# Patient Record
Sex: FEMALE | Race: WHITE
[De-identification: names, ages, dates, MRNs, and addresses within clinical notes are randomized per-mention and may not be internally consistent; named-entity substitution may affect disease eponyms.]

---

## 2019-08-05 ENCOUNTER — HOSPITAL ENCOUNTER (INPATIENT)
Dept: HOSPITAL 62 - LC | Age: 24
LOS: 2 days | Discharge: HOME | DRG: 831 | End: 2019-08-07
Attending: OBSTETRICS & GYNECOLOGY | Admitting: OBSTETRICS & GYNECOLOGY
Payer: OTHER GOVERNMENT

## 2019-08-05 DIAGNOSIS — O99.513: Primary | ICD-10-CM

## 2019-08-05 DIAGNOSIS — J30.9: ICD-10-CM

## 2019-08-05 DIAGNOSIS — J18.9: ICD-10-CM

## 2019-08-05 DIAGNOSIS — Z3A.39: ICD-10-CM

## 2019-08-05 LAB
%HYPO/RBC NFR BLD AUTO: SLIGHT %
ABSOLUTE LYMPHOCYTES# (MANUAL): 2.5 10^3/UL (ref 0.5–4.7)
ABSOLUTE MONOCYTES # (MANUAL): 1 10^3/UL (ref 0.1–1.4)
ADD MANUAL DIFF: YES
ALBUMIN SERPL-MCNC: 3.2 G/DL (ref 3.5–5)
ALP SERPL-CCNC: 90 U/L (ref 38–126)
ANION GAP SERPL CALC-SCNC: 9 MMOL/L (ref 5–19)
APPEARANCE UR: CLEAR
APTT PPP: YELLOW S
AST SERPL-CCNC: 21 U/L (ref 14–36)
BARBITURATES UR QL SCN: NEGATIVE
BASOPHILS NFR BLD MANUAL: 0 % (ref 0–2)
BILIRUB DIRECT SERPL-MCNC: 0.2 MG/DL (ref 0–0.4)
BILIRUB SERPL-MCNC: 0.3 MG/DL (ref 0.2–1.3)
BILIRUB UR QL STRIP: NEGATIVE
BUN SERPL-MCNC: 6 MG/DL (ref 7–20)
CALCIUM: 8.5 MG/DL (ref 8.4–10.2)
CHLORIDE SERPL-SCNC: 104 MMOL/L (ref 98–107)
CO2 SERPL-SCNC: 24 MMOL/L (ref 22–30)
EOSINOPHIL NFR BLD MANUAL: 0 % (ref 0–6)
ERYTHROCYTE [DISTWIDTH] IN BLOOD BY AUTOMATED COUNT: 13.5 % (ref 11.5–14)
GLUCOSE SERPL-MCNC: 88 MG/DL (ref 75–110)
GLUCOSE UR STRIP-MCNC: NEGATIVE MG/DL
HCT VFR BLD CALC: 36.9 % (ref 36–47)
HGB BLD-MCNC: 12.6 G/DL (ref 12–15.5)
KETONES UR STRIP-MCNC: (no result) MG/DL
MCH RBC QN AUTO: 32.8 PG (ref 27–33.4)
MCHC RBC AUTO-ENTMCNC: 34 G/DL (ref 32–36)
MCV RBC AUTO: 96 FL (ref 80–97)
METHADONE UR QL SCN: NEGATIVE
MONOCYTES % (MANUAL): 5 % (ref 3–13)
NEUTS BAND NFR BLD MANUAL: 6 % (ref 3–5)
NITRITE UR QL STRIP: NEGATIVE
PCP UR QL SCN: NEGATIVE
PH UR STRIP: 6 [PH] (ref 5–9)
PLATELET # BLD: 138 10^3/UL (ref 150–450)
PLATELET COMMENT: (no result)
POTASSIUM SERPL-SCNC: 3.8 MMOL/L (ref 3.6–5)
PROT SERPL-MCNC: 5.7 G/DL (ref 6.3–8.2)
PROT UR STRIP-MCNC: NEGATIVE MG/DL
RBC # BLD AUTO: 3.83 10^6/UL (ref 3.72–5.28)
SEGMENTED NEUTROPHILS % (MAN): 77 % (ref 42–78)
SP GR UR STRIP: 1.02
TOTAL CELLS COUNTED BLD: 100
URINE AMPHETAMINES SCREEN: NEGATIVE
URINE BENZODIAZEPINES SCREEN: NEGATIVE
URINE COCAINE SCREEN: NEGATIVE
URINE MARIJUANA (THC) SCREEN: NEGATIVE
UROBILINOGEN UR-MCNC: 2 MG/DL (ref ?–2)
VARIANT LYMPHS NFR BLD MANUAL: 12 % (ref 13–45)
WBC # BLD AUTO: 20.7 10^3/UL (ref 4–10.5)

## 2019-08-05 PROCEDURE — 94760 N-INVAS EAR/PLS OXIMETRY 1: CPT

## 2019-08-05 PROCEDURE — 81001 URINALYSIS AUTO W/SCOPE: CPT

## 2019-08-05 PROCEDURE — 83605 ASSAY OF LACTIC ACID: CPT

## 2019-08-05 PROCEDURE — 80307 DRUG TEST PRSMV CHEM ANLYZR: CPT

## 2019-08-05 PROCEDURE — 59025 FETAL NON-STRESS TEST: CPT

## 2019-08-05 PROCEDURE — 80202 ASSAY OF VANCOMYCIN: CPT

## 2019-08-05 PROCEDURE — 87804 INFLUENZA ASSAY W/OPTIC: CPT

## 2019-08-05 PROCEDURE — 80053 COMPREHEN METABOLIC PANEL: CPT

## 2019-08-05 PROCEDURE — 85025 COMPLETE CBC W/AUTO DIFF WBC: CPT

## 2019-08-05 PROCEDURE — 36415 COLL VENOUS BLD VENIPUNCTURE: CPT

## 2019-08-05 PROCEDURE — 82565 ASSAY OF CREATININE: CPT

## 2019-08-05 PROCEDURE — 94640 AIRWAY INHALATION TREATMENT: CPT

## 2019-08-05 PROCEDURE — 87040 BLOOD CULTURE FOR BACTERIA: CPT

## 2019-08-05 PROCEDURE — 76770 US EXAM ABDO BACK WALL COMP: CPT

## 2019-08-05 PROCEDURE — 87205 SMEAR GRAM STAIN: CPT

## 2019-08-05 PROCEDURE — 87186 SC STD MICRODIL/AGAR DIL: CPT

## 2019-08-05 PROCEDURE — 94799 UNLISTED PULMONARY SVC/PX: CPT

## 2019-08-05 PROCEDURE — 82962 GLUCOSE BLOOD TEST: CPT

## 2019-08-05 PROCEDURE — 87070 CULTURE OTHR SPECIMN AEROBIC: CPT

## 2019-08-05 PROCEDURE — 71046 X-RAY EXAM CHEST 2 VIEWS: CPT

## 2019-08-05 PROCEDURE — 87077 CULTURE AEROBIC IDENTIFY: CPT

## 2019-08-05 RX ADMIN — VANCOMYCIN HYDROCHLORIDE SCH MLS/HR: 1 INJECTION, POWDER, LYOPHILIZED, FOR SOLUTION INTRAVENOUS at 21:38

## 2019-08-05 RX ADMIN — FLUTICASONE PROPIONATE SCH SPR: 50 SPRAY, METERED NASAL at 21:39

## 2019-08-05 RX ADMIN — SODIUM CHLORIDE, SODIUM LACTATE, POTASSIUM CHLORIDE, AND CALCIUM CHLORIDE PRN MLS/HR: .6; .31; .03; .02 INJECTION, SOLUTION INTRAVENOUS at 18:30

## 2019-08-05 NOTE — ADMISSION PHYSICAL
=================================================================



=================================================================

Datetime Report Generated by CPN: 2019 21:16

   

   

=================================================================

CURRENT ADMISSION

=================================================================

   

Chief Complaint:  Other

Chief Complaint Other:  back pain and shortness of breath

Indication for Induction:  Not Applicable

Admit Impression :  Medical Complication

Admit Plan:  Admit to Unit

   

=================================================================

ALLERGIES

=================================================================

   

Medication Allergies:  No

Medication Allergies:  No Known Allergies (2019)

Latex:  No Latex Allergies

Food Allergies:  cat

Environmental Allergies:  Seasonal/dust

   

=================================================================

OBSTETRICAL HISTORY

=================================================================

   

EDC:  2019 00:00

:  5

Para:  2

Term:  2

SAB:  2

Ectopic:  0

Livin

Cesareans:  0

Multiple Births:  0

Gestational Diabetes:  No

Rh Sensitization:  No

Incompetent Cervix:  No

JEYSON:  No

Infertility:  No

ART Treatment:  No

Uterine Anomaly:  No

IUGR:  No

Hx Previous C/S:  No

Macrosomia:  Yes

Hx Loss/Stillborn:  No

PIH:  No

Hx  Death:  No

Placenta Previa/Abruption:  No

Depression/PP Depression:  Yes

PTL/PROM:  No

Post Partum Hemorrhage:  No

Current Pregnancy Procedures:  Ultrasound

Obstetrical History Comments:  2014, 

   G

   

=================================================================

***SEE PRENATAL RECORDS***

=================================================================

   

Alcohol:  No

Marijuana :  No

Cocaine:  No

Other Illicit Drugs:  No

Cigarettes:  Never Smoker. [id number]

   

=================================================================

MEDICAL HISTORY

=================================================================

   

Diabetes:  No

Blood Transfusion:  No

Pulmonary Disease (Asthma, TB):  No

Breast Disease:  No

Hypertension:  No

Gyn Surgery:  No

Heart Disease:  No

Hosp/Surgery:  No

Autoimmune Disorder:  No

Anesthetic Complications:  No

Kidney Disease:  No

Abnormal Pap Smear:  No

Neuro/Epilepsy:  No

Psychiatric Disorders:  No

Other Medical Diseases:  No

Hepatitis/Liver Disease:  No

Significant Family History:  No

Varicosities/Phlebitis:  No

Trauma/Violence :  No

Thyroid Dysfunction:  No

   

=================================================================

INFECTIOUS HISTORY

=================================================================

   

Gonorrhea:  No

Genital Herpes:  No

Chlamydia:  No

Tuberculosis:  No

Syphilis:  No

Hepatitis:  No

HIV/AIDS Exposure:  No

Rash or Viral Illness:  No

HPV:  No

   

=================================================================

PHYSICAL EXAM

=================================================================

   

General:  Normal

HEENT:  Normal

Neurologic:  Normal

Thyroid:  Normal

Heart:  Normal

Lungs:  Abnormal

Breast:  Deferred

Back:  Abnormal

Abdomen:  Normal

Genitourinary Exam:  Normal

Extremities:  Normal

DTRs:  Normal

Pelvic Type:  Adequate

Physical Exam Comments:  decrease breath on right and right sided pain

Vital Signs:  Reviewed

   

=================================================================

VAGINAL EXAM

=================================================================

   

Dilatation:  0

Effacement:  0

Station:  -2

   

=================================================================

MEMBRANES

=================================================================

   

Pooling:  Negative

Membranes:  Intact

   

=================================================================

FETUS A

=================================================================

   

EGA:  39.0

Monitoring:  External US

FHR- Baseline:  160

Variability:  Moderate 6-25bpm

Decelerations:  None

FHR Category:  Category I

Fetal Presentation:  Vertex

Admit Comment:  admit for iv antibiotics, rhocephin and vanc.  monitor

   on labor and delivery

   

=================================================================

PLANS FOR LABOR AND DELIVERY

=================================================================

   

Labor and Delivery:  None

Pain Management:  Epidural

Feeding Preference:  Breast

Circumcision:  N/A

   

=================================================================

INFORMED CONSENT

=================================================================

   

Signature:  Electronically signed by Bethany Jacques MD ([email or web address]) on

   2019 at 21:16  with User ID: [email or web address]

## 2019-08-05 NOTE — RADIOLOGY REPORT (SQ)
EXAM DESCRIPTION: 



XR CHEST 2 VIEWS



COMPLETED DATE/TME:  08/05/2019 18:52



CLINICAL HISTORY:  23 years  Female  iup 39 weeks SOB, fever

right acute onset back pain



COMPARISON:  None.



FINDINGS: 



Elevation of the right hemidiaphragm.

Left lung is clear.

Infiltrate in the right lower lobe with right pleural effusion.

Findings suggest pneumonia.  







IMPRESSION: 



Infiltrate in the right lower lobe concerning for pneumonia with

probable small associated parapneumonic effusion

## 2019-08-05 NOTE — RADIOLOGY REPORT (SQ)
EXAM DESCRIPTION: 



US RETROPERITONEUM



COMPLETED DATE/TME:  08/05/2019 18:52



CLINICAL HISTORY: 



23 years, Female, iup 39 weeks SOB, right flank pain 



Findings: Right kidney measures 12.2 cm. Left kidney measures

13.6 cm. There is mild right hydronephrosis. No left

hydronephrosis. Fetal heart rate 157 bpm. Unable to visualize

bladder due to Telles catheter and fetal position. No evidence for

renal mass or calculus.



IMPRESSION:



Mild right hydronephrosis.

## 2019-08-06 LAB
A TYPE INFLUENZA AG: NEGATIVE
ADD MANUAL DIFF: NO
B INFLUENZA AG: NEGATIVE
BASOPHILS # BLD AUTO: 0 10^3/UL (ref 0–0.2)
BASOPHILS NFR BLD AUTO: 0.1 % (ref 0–2)
EOSINOPHIL # BLD AUTO: 0 10^3/UL (ref 0–0.6)
EOSINOPHIL NFR BLD AUTO: 0.2 % (ref 0–6)
ERYTHROCYTE [DISTWIDTH] IN BLOOD BY AUTOMATED COUNT: 13.2 % (ref 11.5–14)
HCT VFR BLD CALC: 32.5 % (ref 36–47)
HGB BLD-MCNC: 11.4 G/DL (ref 12–15.5)
LYMPHOCYTES # BLD AUTO: 1.7 10^3/UL (ref 0.5–4.7)
LYMPHOCYTES NFR BLD AUTO: 9.5 % (ref 13–45)
MCH RBC QN AUTO: 33.5 PG (ref 27–33.4)
MCHC RBC AUTO-ENTMCNC: 35 G/DL (ref 32–36)
MCV RBC AUTO: 96 FL (ref 80–97)
MONOCYTES # BLD AUTO: 1.3 10^3/UL (ref 0.1–1.4)
MONOCYTES NFR BLD AUTO: 7.2 % (ref 3–13)
NEUTROPHILS # BLD AUTO: 14.7 10^3/UL (ref 1.7–8.2)
NEUTS SEG NFR BLD AUTO: 83 % (ref 42–78)
PLATELET # BLD: 127 10^3/UL (ref 150–450)
RBC # BLD AUTO: 3.39 10^6/UL (ref 3.72–5.28)
TOTAL CELLS COUNTED % (AUTO): 100 %
VANCOMYCIN,TROUGH: 5.8 UG/ML (ref 5–20)
WBC # BLD AUTO: 17.7 10^3/UL (ref 4–10.5)

## 2019-08-06 RX ADMIN — ACETAMINOPHEN PRN MG: 325 TABLET ORAL at 00:55

## 2019-08-06 RX ADMIN — FLUTICASONE PROPIONATE SCH SPR: 50 SPRAY, METERED NASAL at 09:52

## 2019-08-06 RX ADMIN — ALBUTEROL SULFATE SCH MG: 2.5 SOLUTION RESPIRATORY (INHALATION) at 20:52

## 2019-08-06 RX ADMIN — VANCOMYCIN HYDROCHLORIDE SCH MLS/HR: 1 INJECTION, POWDER, LYOPHILIZED, FOR SOLUTION INTRAVENOUS at 21:24

## 2019-08-06 RX ADMIN — VANCOMYCIN HYDROCHLORIDE SCH MLS/HR: 1 INJECTION, POWDER, LYOPHILIZED, FOR SOLUTION INTRAVENOUS at 14:13

## 2019-08-06 RX ADMIN — FLUTICASONE PROPIONATE SCH SPR: 50 SPRAY, METERED NASAL at 21:26

## 2019-08-06 RX ADMIN — VANCOMYCIN HYDROCHLORIDE SCH MLS/HR: 1 INJECTION, POWDER, LYOPHILIZED, FOR SOLUTION INTRAVENOUS at 05:48

## 2019-08-06 RX ADMIN — ACETAMINOPHEN PRN MG: 325 TABLET ORAL at 05:48

## 2019-08-06 RX ADMIN — ALBUTEROL SULFATE SCH: 2.5 SOLUTION RESPIRATORY (INHALATION) at 15:52

## 2019-08-06 RX ADMIN — SODIUM CHLORIDE, SODIUM LACTATE, POTASSIUM CHLORIDE, AND CALCIUM CHLORIDE PRN MLS/HR: .6; .31; .03; .02 INJECTION, SOLUTION INTRAVENOUS at 14:12

## 2019-08-06 NOTE — PDOC PROGRESS REPORT
Subjective


Progress Note for:: 08/06/19


Subjective:: 





patient feels much better today but still congested.  c/o not being able to 

"cough up" the sputum she feels.  Is inquiring about removing the miguel 

catheter.  


Reason For Visit: 


PNEUMONIA








Physical Exam





- Physical Exam


Vital Signs: 


                                 Intake & Output











 08/05/19 08/06/19 08/07/19





 06:59 06:59 06:59


 


Intake Total  1250 


 


Balance  1250 


 


Weight  102.076 kg 











General appearance: PRESENT: cooperative, mild distress


Respiratory exam: PRESENT: clear to auscultation clifford, symmetrical, unlabored


Pulses: PRESENT: normal radial pulses





Result


Laboratory Results: 


                                        





                                 08/06/19 06:04 





                                 08/05/19 18:38 





                                        











  08/05/19 08/05/19 08/05/19





  18:15 18:38 18:38


 


WBC   20.7 H 


 


RBC   3.83 


 


Hgb   12.6 


 


Hct   36.9 


 


MCV   96 


 


MCH   32.8 


 


MCHC   34.0 


 


RDW   13.5 


 


Plt Count   138 L 


 


Seg Neutrophils %   Not Reportable 


 


Lymphocytes %   Not Reportable 


 


Monocytes %   Not Reportable 


 


Eosinophils %   Not Reportable 


 


Basophils %   Not Reportable 


 


Absolute Neutrophils   Not Reportable 


 


Absolute Lymphocytes   Not Reportable 


 


Absolute Monocytes   Not Reportable 


 


Absolute Eosinophils   Not Reportable 


 


Absolute Basophils   Not Reportable 


 


Sodium    136.9 L


 


Potassium    3.8


 


Chloride    104


 


Carbon Dioxide    24


 


Anion Gap    9


 


BUN    6 L


 


Creatinine    0.43 L


 


Est GFR ( Amer)    > 60


 


Est GFR (Non-Af Amer)    > 60


 


Glucose    88


 


Lactic Acid   


 


Calcium    8.5


 


Total Bilirubin    0.3


 


AST    21


 


Alkaline Phosphatase    90


 


Total Protein    5.7 L


 


Albumin    3.2 L


 


Urine Color  YELLOW  


 


Urine Appearance  CLEAR  


 


Urine pH  6.0  


 


Ur Specific Gravity  1.016  


 


Urine Protein  NEGATIVE  


 


Urine Glucose (UA)  NEGATIVE  


 


Urine Ketones  TRACE H  


 


Urine Blood  NEGATIVE  


 


Urine Nitrite  NEGATIVE  


 


Ur Leukocyte Esterase  NEGATIVE  


 


Urine WBC (Auto)  2  


 


Urine RBC (Auto)  0  














  08/05/19 08/06/19





  19:30 06:04


 


WBC   17.7 H


 


RBC   3.39 L


 


Hgb   11.4 L


 


Hct   32.5 L


 


MCV   96


 


MCH   33.5 H


 


MCHC   35.0


 


RDW   13.2


 


Plt Count   127 L


 


Seg Neutrophils %   83.0 H


 


Lymphocytes %   9.5 L


 


Monocytes %   7.2


 


Eosinophils %   0.2


 


Basophils %   0.1


 


Absolute Neutrophils   14.7 H


 


Absolute Lymphocytes   1.7


 


Absolute Monocytes   1.3


 


Absolute Eosinophils   0.0


 


Absolute Basophils   0.0


 


Sodium  


 


Potassium  


 


Chloride  


 


Carbon Dioxide  


 


Anion Gap  


 


BUN  


 


Creatinine  


 


Est GFR ( Amer)  


 


Est GFR (Non-Af Amer)  


 


Glucose  


 


Lactic Acid  1.1 


 


Calcium  


 


Total Bilirubin  


 


AST  


 


Alkaline Phosphatase  


 


Total Protein  


 


Albumin  


 


Urine Color  


 


Urine Appearance  


 


Urine pH  


 


Ur Specific Gravity  


 


Urine Protein  


 


Urine Glucose (UA)  


 


Urine Ketones  


 


Urine Blood  


 


Urine Nitrite  


 


Ur Leukocyte Esterase  


 


Urine WBC (Auto)  


 


Urine RBC (Auto)  











Impressions: 


                                        





Chest X-Ray  08/05/19 18:52


IMPRESSION: 


 


Infiltrate in the right lower lobe concerning for pneumonia with


probable small associated parapneumonic effusion


 








Renal Ultrasound  08/05/19 18:52


IMPRESSION:


 


Mild right hydronephrosis.


 














Assessment & Plan





- Diagnosis


(1) Allergic sinusitis


Is this a current diagnosis for this admission?: Yes   





(2) Pneumonia


Qualifiers: 


   Laterality: right   Lung location: lower lobe of lung 


Is this a current diagnosis for this admission?: Yes   





(3) Shortness of breath


Is this a current diagnosis for this admission?: Yes   





- Time


Time Spent with patient: Less than 15 minutes


Within: within 48 hours





- Inpatient Certification


Based on my medical assessment, after consideration of the patient's 

comorbidities, presenting symptoms, or acuity I expect that the services needed 

warrant INPATIENT care.: Yes


I certify that my determination is in accordance with my understanding of 

Medicare's requirements for reasonable and necessary INPATIENT services [42 CFR 

412.3e].: Yes


Medical Necessity: Need for IV Antibiotics





- Plan Summary


Plan Summary: 





d/w patient that discharge would be dependent on the improvement of her 

symptoms.  advised her that unless she goes into labor would rather improve her 

medical status.  will monitor twice a day for FHTs.  albuterol treatments, prn.

## 2019-08-06 NOTE — PDOC CONSULTATION
Consultation


Consult Date: 08/05/19


Attending physician:: MOSES VAUGHAN


Provider Consulted: WADE SILVERIO


Consult reason:: Shortness of breath





History of Present Illness


Admission Date/PCP: 


  08/05/19 21:15





  





Patient complains of: Shortness of breath


History of Present Illness: 


SANDY JONES is a 23 year old female at an estimated 39 weeks of gestation 

admitted to L&D with complaints of shortness of breath and right lower back 

pain.  Work-up reveals tachypnea, fever, leukocytosis and an infiltrate at the 

right base on chest x-ray.  She started on oxygen on Rocephin, hospitalist 

consulted for the above.  Patient complains of chronic allergic sinusitis, 

postnasal drip developing sharp nonradiating lower back pain worsened with deep 

breathing associated with subjective fever.  She denies previous pneumonia or 

recent antibiotic use.








Past Medical History


EENT Medical History: Reports: Other - Chronic allergic sinusitis





Past Surgical History


Past Surgical History: Reports: None





Social History


Information Source: Patient, H Records


Smoking Status: Never Smoker


Frequency of Alcohol Use: None


Drugs: None





- Advance Directive


Resuscitation Status: Full Code





Family History


Family History: Other - No thrombosis.  denies: COPD, DM


Parental Family History Reviewed: Yes


Children Family History Reviewed: Yes


Sibling(s) Family History Reviewed.: Yes





Medication/Allergy


Home Medications: 








Cetirizine HCl/Pseudoephedrine [Zyrtec-D 12 Hour Tablet] 1 tab PO BID 08/05/19 


Prenatal 95/Iron Fum/Folic/Dha [Prenatal + Dha Combo Pack] 1 tab PO DAILY 

08/05/19 








Allergies/Adverse Reactions: 


                                        





No Known Allergies Allergy (Unverified 08/05/19 17:58)


   











Review of Systems


Constitutional: PRESENT: as per HPI.  ABSENT: chills, fever(s), headache(s), 

weight gain, weight loss


Eyes: ABSENT: visual disturbances


Ears: ABSENT: hearing changes


Nose, Mouth, and Throat: PRESENT: other - Chronic rhinorrhea.  ABSENT: mouth 

pain, sore throat


Cardiovascular: ABSENT: chest pain, dyspnea on exertion, edema, orthropnea, 

palpitations


Respiratory: PRESENT: cough, dyspnea.  ABSENT: hemoptysis, sputum


Gastrointestinal: ABSENT: abdominal pain, constipation, diarrhea, hematemesis, 

hematochezia, nausea, vomiting


Genitourinary: ABSENT: dysuria, hematuria


Musculoskeletal: ABSENT: joint swelling


Integumentary: ABSENT: rash, wounds


Neurological: ABSENT: abnormal gait, abnormal speech, confusion, dizziness, 

focal weakness, syncope


Psychiatric: ABSENT: anxiety, depression, homidical ideation, suicidal ideation


Endocrine: ABSENT: cold intolerance, heat intolerance, polydipsia, polyuria


Hematologic/Lymphatic: ABSENT: easy bleeding, easy bruising





Physical Exam


Vital Signs: 


                                 Intake & Output











 08/04/19 08/05/19 08/06/19





 11:59 11:59 11:59


 


Intake Total   1250


 


Balance   1250


 


Weight   102.076 kg











General appearance: PRESENT: cooperative, mild distress, well-developed, well-

nourished


Head exam: PRESENT: atraumatic, normocephalic


Eye exam: PRESENT: conjunctiva pink, EOMI, PERRLA.  ABSENT: scleral icterus


Ear exam: PRESENT: normal external ear exam


Mouth exam: PRESENT: moist, tongue midline


Throat exam: PRESENT: other - Nasal mucosa cobblestoning


Neck exam: ABSENT: carotid bruit, JVD, lymphadenopathy, thyromegaly


Respiratory exam: PRESENT: accessory muscle use, crackles, retraction, 

tachypnea.  ABSENT: rales, rhonchi, wheezes


Cardiovascular exam: PRESENT: RRR.  ABSENT: diastolic murmur, rubs, systolic m

urmur


Pulses: PRESENT: normal dorsalis pedis pul


Vascular exam: PRESENT: normal capillary refill


GI/Abdominal exam: PRESENT: normal bowel sounds, soft.  ABSENT: distended, 

guarding, mass, organolmegaly, rebound, tenderness


Rectal exam: PRESENT: deferred


Extremities exam: PRESENT: full ROM.  ABSENT: calf tenderness, clubbing, pedal 

edema


Neurological exam: PRESENT: alert, awake, oriented to person, oriented to place,

oriented to time, oriented to situation, CN II-XII grossly intact.  ABSENT: 

motor sensory deficit


Psychiatric exam: PRESENT: appropriate affect, normal mood.  ABSENT: homicidal 

ideation, suicidal ideation


Skin exam: PRESENT: dry, intact, warm.  ABSENT: cyanosis, rash





Results


Laboratory Results: 


                                        





                                 08/05/19 18:38 





                                 08/05/19 18:38 





                                        











  08/05/19 08/05/19 08/05/19





  18:15 18:38 18:38


 


WBC   20.7 H 


 


RBC   3.83 


 


Hgb   12.6 


 


Hct   36.9 


 


MCV   96 


 


MCH   32.8 


 


MCHC   34.0 


 


RDW   13.5 


 


Plt Count   138 L 


 


Seg Neutrophils %   Not Reportable 


 


Lymphocytes %   Not Reportable 


 


Monocytes %   Not Reportable 


 


Eosinophils %   Not Reportable 


 


Basophils %   Not Reportable 


 


Absolute Neutrophils   Not Reportable 


 


Absolute Lymphocytes   Not Reportable 


 


Absolute Monocytes   Not Reportable 


 


Absolute Eosinophils   Not Reportable 


 


Absolute Basophils   Not Reportable 


 


Sodium    136.9 L


 


Potassium    3.8


 


Chloride    104


 


Carbon Dioxide    24


 


Anion Gap    9


 


BUN    6 L


 


Creatinine    0.43 L


 


Est GFR ( Amer)    > 60


 


Est GFR (Non-Af Amer)    > 60


 


Glucose    88


 


Lactic Acid   


 


Calcium    8.5


 


Total Bilirubin    0.3


 


AST    21


 


Alkaline Phosphatase    90


 


Total Protein    5.7 L


 


Albumin    3.2 L


 


Urine Color  YELLOW  


 


Urine Appearance  CLEAR  


 


Urine pH  6.0  


 


Ur Specific Gravity  1.016  


 


Urine Protein  NEGATIVE  


 


Urine Glucose (UA)  NEGATIVE  


 


Urine Ketones  TRACE H  


 


Urine Blood  NEGATIVE  


 


Urine Nitrite  NEGATIVE  


 


Ur Leukocyte Esterase  NEGATIVE  


 


Urine WBC (Auto)  2  


 


Urine RBC (Auto)  0  














  08/05/19





  19:30


 


WBC 


 


RBC 


 


Hgb 


 


Hct 


 


MCV 


 


MCH 


 


MCHC 


 


RDW 


 


Plt Count 


 


Seg Neutrophils % 


 


Lymphocytes % 


 


Monocytes % 


 


Eosinophils % 


 


Basophils % 


 


Absolute Neutrophils 


 


Absolute Lymphocytes 


 


Absolute Monocytes 


 


Absolute Eosinophils 


 


Absolute Basophils 


 


Sodium 


 


Potassium 


 


Chloride 


 


Carbon Dioxide 


 


Anion Gap 


 


BUN 


 


Creatinine 


 


Est GFR ( Amer) 


 


Est GFR (Non-Af Amer) 


 


Glucose 


 


Lactic Acid  1.1


 


Calcium 


 


Total Bilirubin 


 


AST 


 


Alkaline Phosphatase 


 


Total Protein 


 


Albumin 


 


Urine Color 


 


Urine Appearance 


 


Urine pH 


 


Ur Specific Gravity 


 


Urine Protein 


 


Urine Glucose (UA) 


 


Urine Ketones 


 


Urine Blood 


 


Urine Nitrite 


 


Ur Leukocyte Esterase 


 


Urine WBC (Auto) 


 


Urine RBC (Auto) 











Impressions: 


                                        





Chest X-Ray  08/05/19 18:52


IMPRESSION: 


 


Infiltrate in the right lower lobe concerning for pneumonia with


probable small associated parapneumonic effusion


 








Renal Ultrasound  08/05/19 18:52


IMPRESSION:


 


Mild right hydronephrosis.


 














Assessment and Plan





- Diagnosis


(1) Shortness of breath


Is this a current diagnosis for this admission?: Yes   


Plan: 


Multifactorial secondary to 39-week gestation, likely atelectasis and pneumonia.

 Supplemental oxygen, incentive spirometry ordered








(2) Pneumonia


Is this a current diagnosis for this admission?: Yes   


Plan: 


Agree with Rocephin, suggest addition of vancomycin, incentive spirometry, 

aggressive pulmonary toilet, consider albuterol as needed follow-up blood 

culture and CBC








(3) Allergic sinusitis


Is this a current diagnosis for this admission?: Yes   


Plan: 


Flonase initiated, consider chlorpheniramine if persistent rhinorrhea








- Time


Time Spent with patient: 25-34 minutes





- Inpatient Certification


Medical Necessity: Need Close Monitoring Due to Risk of Patient Decompensation

## 2019-08-07 RX ADMIN — ALBUTEROL SULFATE SCH MG: 2.5 SOLUTION RESPIRATORY (INHALATION) at 07:43

## 2019-08-07 RX ADMIN — VANCOMYCIN HYDROCHLORIDE SCH MLS/HR: 1 INJECTION, POWDER, LYOPHILIZED, FOR SOLUTION INTRAVENOUS at 05:59

## 2019-08-07 RX ADMIN — SODIUM CHLORIDE, SODIUM LACTATE, POTASSIUM CHLORIDE, AND CALCIUM CHLORIDE PRN MLS/HR: .6; .31; .03; .02 INJECTION, SOLUTION INTRAVENOUS at 02:23

## 2019-08-07 RX ADMIN — ALBUTEROL SULFATE SCH MG: 2.5 SOLUTION RESPIRATORY (INHALATION) at 02:20

## 2019-08-07 NOTE — NON STRESS TEST REPORT
=================================================================

Non Stress Test

=================================================================

Datetime Report Generated by CPN: 08/07/2019 08:44

   

   

=================================================================

DEMOGRAPHIC

=================================================================

   

EGA NST:  39.2

   

=================================================================

INDICATION

=================================================================

   

Indication for Study:  Ordered by Provider

   

=================================================================

MONITORING

=================================================================

   

Monitor Explained:  Monitor Explained; Test Explained; Patient

   Verbalized Understanding

Time on Monitor:  08/07/2019 07:38

Time off Monitor:  08/07/2019 08:00

NST Duration:  22

   

=================================================================

NST INTERVENTIONS

=================================================================

   

NST Interventions:  None

Physician Notified NST:  Dr. Sushil

BABY A:  [id number]

   

=================================================================

BABY A

=================================================================

   

Fetal Movement :  Present

Contraction Frequency :  none

FHR Baseline :  120

Accelerations :  15X15

Decelerations :  None

Variability :  Moderate 6-25bpm

NST Review:  Meets Criteria for Reactive NST

NST Review and Verified By :  Lara Camp RNC

NST Results:  Reactive

   

=================================================================

NST REPORT

=================================================================

   

Report Trigger:  Send Report

## 2019-08-07 NOTE — PDOC DISCHARGE SUMMARY
General





- Admit/Disc Date/PCP


Admission Date/Primary Care Provider: 


  19 21:15





  





Discharge Date: 19





- Discharge Diagnosis


(1) Allergic sinusitis


Is this a current diagnosis for this admission?: Yes   





(2) Pneumonia


Is this a current diagnosis for this admission?: Yes   





(3) Shortness of breath


Is this a current diagnosis for this admission?: Yes   





(4) Pregnancy


Is this a current diagnosis for this admission?: Yes   





- Additional Information


Resuscitation Status: Full Code


Discharge Diet: As Tolerated


Discharge Activity: Balance Activity w/Rest


Prescriptions: 


Albuterol Sulfate [Proair Hfa Inhalation Aerosol 8.5 gm Mdi] 2 puff IH Q4 PRN #1

mdi


 PRN Reason: 


Azithromycin [Zithromax 250 mg Tablet] 500 mg PO DAILY #6 tab


Cefuroxime Axetil [Ceftin 250 mg Tablet] 1 tab PO BID 1 Days #10 tablet


Home Medications: 








Cetirizine HCl/Pseudoephedrine [Zyrtec-D 12 Hour Tablet] 1 tab PO BID 19 


Prenatal 95/Iron Fum/Folic/Dha [Prenatal + Dha Combo Pack] 1 tab PO DAILY 

19 


Albuterol Sulfate [Proair Hfa Inhalation Aerosol 8.5 gm Mdi] 2 puff IH Q4 PRN #1

mdi 19 


Azithromycin [Zithromax 250 mg Tablet] 500 mg PO DAILY #6 tab 19 


Cefuroxime Axetil [Ceftin 250 mg Tablet] 1 tab PO BID 1 Days #10 tablet 19













History of Present Illness


History of Present Illness: 


SANDY JONES is a 23 year old female  @ 39  admitted for community 

acquired pneumonia.  Has received two + days of IV antibitics and has responded 

well.  Albuterol treatments have helped with breathing and now able to loosen 

her congestion to eliminate it.  Desires discharge home at this time.








Physical Exam





- Physical Exam


Vital Signs: 


                                        











Temp Pulse Resp BP Pulse Ox


 


    93   16      100 


 


    19 02:21  19 02:21     19 02:21








                                 Intake & Output











 19





 06:59 06:59 06:59


 


Intake Total  1250 1750


 


Balance  1250 1750


 


Weight  102.076 kg 











General appearance: PRESENT: no acute distress, cooperative


Respiratory exam: PRESENT: wheezes - mild on right lower quadrant.  Otherwise, 

no other





Result


Laboratory Results: 


                                        





                                 19 06:04 





                                 19 21:14 





                                        











  19





  06:04 21:14


 


WBC  17.7 H 


 


RBC  3.39 L 


 


Hgb  11.4 L 


 


Hct  32.5 L 


 


MCV  96 


 


MCH  33.5 H 


 


MCHC  35.0 


 


RDW  13.2 


 


Plt Count  127 L 


 


Seg Neutrophils %  83.0 H 


 


Lymphocytes %  9.5 L 


 


Monocytes %  7.2 


 


Eosinophils %  0.2 


 


Basophils %  0.1 


 


Absolute Neutrophils  14.7 H 


 


Absolute Lymphocytes  1.7 


 


Absolute Monocytes  1.3 


 


Absolute Eosinophils  0.0 


 


Absolute Basophils  0.0 


 


Creatinine   0.39 L


 


Est GFR ( Amer)   > 60


 


Est GFR (Non-Af Amer)   > 60











Impressions: 


                                        





Chest X-Ray  19 18:52


IMPRESSION: 


 


Infiltrate in the right lower lobe concerning for pneumonia with


probable small associated parapneumonic effusion


 








Renal Ultrasound  19 18:52


IMPRESSION:


 


Mild right hydronephrosis.


 














Plan


Discharge Plan: 





spoke with hospitalist for recommendations for outpatient abx.  will discharge 

home with strict instructions for follow up with primary OB/gyn.


Time Spent: Less than 30 Minutes





Acute Heart Failure





- **


Is this a Heart Failure Patient?: No